# Patient Record
Sex: FEMALE | Race: BLACK OR AFRICAN AMERICAN
[De-identification: names, ages, dates, MRNs, and addresses within clinical notes are randomized per-mention and may not be internally consistent; named-entity substitution may affect disease eponyms.]

---

## 2017-04-26 ENCOUNTER — HOSPITAL ENCOUNTER (EMERGENCY)
Dept: HOSPITAL 17 - NEPE | Age: 32
Discharge: HOME | End: 2017-04-26
Payer: COMMERCIAL

## 2017-04-26 VITALS
SYSTOLIC BLOOD PRESSURE: 138 MMHG | OXYGEN SATURATION: 100 % | RESPIRATION RATE: 16 BRPM | TEMPERATURE: 98.2 F | DIASTOLIC BLOOD PRESSURE: 67 MMHG | HEART RATE: 67 BPM

## 2017-04-26 VITALS — HEIGHT: 66 IN | BODY MASS INDEX: 26.57 KG/M2 | WEIGHT: 165.35 LBS

## 2017-04-26 DIAGNOSIS — N89.8: Primary | ICD-10-CM

## 2017-04-26 LAB
CHLAMYDIA PCR: NOT DETECTED
COLOR UR: (no result)
COMMENT (UR): (no result)
CULTURE IF INDICATED: (no result)
GLUCOSE UR STRIP-MCNC: (no result) MG/DL
HGB UR QL STRIP: (no result)
KETONES UR STRIP-MCNC: (no result) MG/DL
NEISSERIA PCR: NOT DETECTED
NITRITE UR QL STRIP: (no result)
SP GR UR STRIP: 1.01 (ref 1–1.03)
SQUAMOUS #/AREA URNS HPF: <1 /HPF (ref 0–5)

## 2017-04-26 PROCEDURE — 87491 CHLMYD TRACH DNA AMP PROBE: CPT

## 2017-04-26 PROCEDURE — 87210 SMEAR WET MOUNT SALINE/INK: CPT

## 2017-04-26 PROCEDURE — 81001 URINALYSIS AUTO W/SCOPE: CPT

## 2017-04-26 PROCEDURE — 96372 THER/PROPH/DIAG INJ SC/IM: CPT

## 2017-04-26 PROCEDURE — 99283 EMERGENCY DEPT VISIT LOW MDM: CPT

## 2017-04-26 PROCEDURE — 84703 CHORIONIC GONADOTROPIN ASSAY: CPT

## 2017-04-26 PROCEDURE — 87591 N.GONORRHOEAE DNA AMP PROB: CPT
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
## 2017-05-01 ENCOUNTER — HOSPITAL ENCOUNTER (OUTPATIENT)
Dept: HOSPITAL 17 - CPRE | Age: 32
End: 2017-05-01
Payer: COMMERCIAL

## 2017-05-01 DIAGNOSIS — N62: ICD-10-CM

## 2017-05-01 DIAGNOSIS — R94.31: ICD-10-CM

## 2017-05-01 DIAGNOSIS — Z01.810: Primary | ICD-10-CM

## 2017-05-01 DIAGNOSIS — Z01.812: ICD-10-CM

## 2017-05-01 LAB
ANION GAP SERPL CALC-SCNC: 7 MEQ/L (ref 5–15)
BACTERIA #/AREA URNS HPF: (no result) /HPF
BHCG SCREEN QUALITATIVE: (no result) MIU/ML (ref 0–5)
CHLORIDE SERPL-SCNC: 103 MEQ/L (ref 98–107)
COLOR UR: YELLOW
ERYTHROCYTE [DISTWIDTH] IN BLOOD BY AUTOMATED COUNT: 13.2 % (ref 11.6–17.2)
GLUCOSE UR STRIP-MCNC: (no result) MG/DL
HCO3 BLD-SCNC: 30.4 MEQ/L (ref 21–32)
HCT VFR BLD CALC: 40.2 % (ref 35–46)
HGB UR QL STRIP: (no result)
KETONES UR STRIP-MCNC: (no result) MG/DL
MCH RBC QN AUTO: 25 PG (ref 27–34)
MCHC RBC AUTO-ENTMCNC: 31.7 % (ref 32–36)
MCV RBC AUTO: 79 FL (ref 80–100)
MUCOUS THREADS #/AREA URNS LPF: (no result) /LPF
NITRITE UR QL STRIP: (no result)
PLATELET # BLD: 241 TH/MM3 (ref 150–450)
POTASSIUM SERPL-SCNC: 4.1 MEQ/L (ref 3.5–5.1)
RBC # BLD AUTO: 5.1 MIL/MM3 (ref 4–5.3)
REVIEW FLAG: (no result)
SODIUM SERPL-SCNC: 140 MEQ/L (ref 136–145)
SP GR UR STRIP: 1.03 (ref 1–1.03)
SQUAMOUS #/AREA URNS HPF: 3 /HPF (ref 0–5)
WBC # BLD AUTO: 4.7 TH/MM3 (ref 4–11)

## 2017-05-01 PROCEDURE — 81001 URINALYSIS AUTO W/SCOPE: CPT

## 2017-05-01 PROCEDURE — 85027 COMPLETE CBC AUTOMATED: CPT

## 2017-05-01 PROCEDURE — 93005 ELECTROCARDIOGRAM TRACING: CPT

## 2017-05-01 PROCEDURE — 36415 COLL VENOUS BLD VENIPUNCTURE: CPT

## 2017-05-01 PROCEDURE — 84703 CHORIONIC GONADOTROPIN ASSAY: CPT

## 2017-05-01 PROCEDURE — 80051 ELECTROLYTE PANEL: CPT

## 2017-05-01 NOTE — EKG
Date Performed: 05/01/2017       Time Performed: 10:34:15

 

PTAGE:      31 years

 

EKG:      Sinus rhythm 

 

 LOW QRS VOLTAGE IN PRECORDIAL LEADS POSSIBLE RIGHT VENTRICULAR CONDUCTION DELAY BORDERLINE ECG 

 

NO PREVIOUS TRACING            

 

DOCTOR:   Chris Brokoe  Interpretating Date/Time  05/01/2017 16:30:40

## 2017-05-03 NOTE — MH
cc:

LORI NAVAS M.D.

****

 

DATE OF ADMISSION:  5/4/2017

 

YOB: 1985

 

CHIEF COMPLAINT

Bilateral very large breasts.  Patient desiring breast reduction.

 

HISTORY OF PRESENT ILLNESS

This is a 31-year-old black female who has seen me in my office starting March 27, 2017.  She came in for discussion about breast reduction.  She has very

large breasts for a very long time and has been thinking about getting breast

reduction over several years. The breasts were large even as a teenager.  The

patient has had two pregnancies, one child who is 8-years-old, she did

breastfeed for a short time.  There is a family history of breast cancer in her

grandmother and cousins. She personally has fibrocystic breasts but no masses

or biopsy has ever been done.  She is a nonsmoker, never smoked, has no other

major medical issues, no skin conditions or contraindications to having a

breast reduction done. She underwent detailed explanation of the surgery in my

office including the breast measurements, the position of the current position

of the nipple-areolar complex, the surgery design including the Yanez pattern

inferior pedicle method that is suitable for her. The flap elevation with

breast reduction on lateral superior and medial portions and the reshaping of

the breast with anchor design surgical scars was explained in detail.

Photographs were shown, also other methods of breast reduction were also shown

to her. The overall surgical time being 3-4 hours or slightly longer, general

anesthesia, use of sequential leg compression devices for preventing DVT,

heating blankets and postoperative respiratory deep breathing exercises and use

of Styles catheter were explained.

 

The surgery technique was explained including the flap and the breast mound

shaping, the possibility of intraoperative issues such as those related to

anesthesia or medications, those from surgery itself such as bleeding, damage

to the surrounding structures in the area of surgery including loss of

nipple-areolar sensation, devitalization of the nipple, devitalization of the

skin and fat flaps, possible damage to the remote organs and structures,

postoperatively the use of drains, use of pain medication, antibiotics. Again,

perioperative risk and complications such as bleeding, infection, seroma,

hematoma, wound dehiscence, flap necrosis, possible need for debridements and

open wound care if excising and repairing the flap is not a good option, the

possible long-term changes and surgeries for any asymmetry, fat necrosis, flap

necrosis, possible keloid formation, hypertrophic hyperpigmented or painful

scars, possible use of silicone gel sheets, Kenalog injections and also any

future reshaping surgery will not be covered by her insurance as they will be

considered cosmetic. The postoperative complications may generally be covered

by her insurance.  The patient also understands that the shape, size and

overall outcome can only be approximate and not exact, the two sides may remain

slightly asymmetric, may also settle down differently, there may be changes due

to the surgery inside the breast that may be visible on the mammogram and may

confuse interpretation of the mammogram in terms of breast cancer screening.

She may also experience disproportionate growth of the breast volume if she

loses or gains weight in the future. The patient was also shown multiple other

patient's photographs before and after and she is agreeable to go ahead with

the surgery and is excited about it.

 

PAST MEDICAL HISTORY

The patient's past medical history is negative for any major medical problems.

She does have iron deficiency anemia and there is a history of blood

transfusion being given to her past her miscarriage issue in the past. She had

an iron infusion at one time as well. It is not currently a problem.  She does

not have sickle-cell anemia.  She is not diabetic, although there is a family

history of type 2 diabetes in elderly family members.

The patient has had no others major surgeries.

 

REVIEW OF SYSTEMS

She is 5 feet 3 inches tall, weighs 180 pounds.  No never smoked.

No alcohol or drug abuse.  No risk factor for HIV or hepatitis.  The patient

does not have any bleeding tendencies.  No chronic disorders.

 

PHYSICAL EXAMINATION

GENERAL: Examination shows a 31-year-old black female with

stable vital signs.  The patient is alert, cooperative, fully oriented,

emotionally stable.  She is fully ambulant.

HEAD/NECK: Head and neck shows clear sclerae.  Pupils are equal, round,

reacting to the room light.  Trachea appears in midline.  There is no

significant masses in the Neck. The thyroid is slightly prominent but it feels

uniform to touch.  No nodules palpated.  No bruit or thrills in the area.  The

carotid arteries are also normal.  The neck is slightly on the short side but

otherwise normal movement.

CHEST: The chest has good expansion with normal breathing, moderate obesity,

overall good breath sounds.

HEART: Good heart sounds.  No murmurs.

EXTREMITIES: The upper and lower extremity are also grossly intact.  Again,

there is moderate obesity.

 

BREAST: The local examination of the breast shows bilaterally large breasts

approximately 1000 to 1100 cc volume estimated. The right side is slightly on

the smaller dimensions. The breasts do not have any dominant masses.  No nipple

discharge. Areolares are somewhat enlarged and more oval than circular. The

measurements of the breast shows neck to the nipple distance 29.5 cm on the

right, 31 cm on the left.  Nipple to the inframammary fold distance is 17 cm on

the right and 18 on the left.  Midline neck to the medial breast fold is 13 cm.

Neck to the inframammary fold is 19 cm on the right and 18.5 on the left.

Transverse chest diameter is 19 cm from the medial breast fold.  Two side

medial breast folds are less than 1 cm apart. The medial breast fold to the

nipple distance on the right is 18, on the left it is also 18.  The nipple to

the midline is 11 cm on both sides as well.

 

CLINICAL IMPRESSION

Bilateral macromastia.  The plan is to perform inferior pedicle based Yanez

pattern design reduction mammoplasty. Estimated removal approximately 50%,

500-550 cc range. The patient's laboratory tests will be reviewed when

available on the chart.

 

  

SIGNED, NOT FULLY REVIEWED

 

                               __________________________________

                           MD BRENDEN Mina/MARIBELL

D:  5/3/2017/2:19 PM

T:  5/3/2017/2:48 PM

Visit #:  [id number]

Job #:  [id number]

JUAREZ

## 2017-05-04 ENCOUNTER — HOSPITAL ENCOUNTER (OUTPATIENT)
Dept: HOSPITAL 17 - HSDC | Age: 32
Discharge: HOME | End: 2017-05-04
Payer: COMMERCIAL

## 2017-05-04 VITALS
OXYGEN SATURATION: 96 % | DIASTOLIC BLOOD PRESSURE: 62 MMHG | SYSTOLIC BLOOD PRESSURE: 114 MMHG | TEMPERATURE: 97.2 F | HEART RATE: 76 BPM | RESPIRATION RATE: 16 BRPM

## 2017-05-04 VITALS — WEIGHT: 179.9 LBS | HEIGHT: 62 IN | BODY MASS INDEX: 33.1 KG/M2

## 2017-05-04 VITALS
OXYGEN SATURATION: 98 % | RESPIRATION RATE: 18 BRPM | SYSTOLIC BLOOD PRESSURE: 102 MMHG | DIASTOLIC BLOOD PRESSURE: 64 MMHG | TEMPERATURE: 98.2 F | HEART RATE: 72 BPM

## 2017-05-04 DIAGNOSIS — N62: Primary | ICD-10-CM

## 2017-05-04 DIAGNOSIS — Z80.3: ICD-10-CM

## 2017-05-04 PROCEDURE — 00402 ANES INTEG SYS RCNSTV BREAST: CPT

## 2017-05-04 PROCEDURE — 88305 TISSUE EXAM BY PATHOLOGIST: CPT

## 2017-05-04 PROCEDURE — 19318 BREAST REDUCTION: CPT

## 2017-05-04 PROCEDURE — 88307 TISSUE EXAM BY PATHOLOGIST: CPT

## 2017-05-04 NOTE — MP
cc:

LORI BROWN

****

 

 

DATE OF SURGERY

5/4/17

 

PREOPERATIVE DIAGNOSES

Bilateral macromastia with asymmetry, left side larger than right.

 

POSTOPERATIVE DIAGNOSIS

Bilateral macromastia with asymmetry, left side larger than right.

 

OPERATION

Bilateral reduction mammoplasty

 

SURGEON

Dr. SUJATA Brown.

 

ANESTHESIA

General

 

INDICATIONS

A 31-year-old black female with  fairly large breasts, symptomatic and desiring

elective breast reduction.  She underwent detailed explanation of the

procedure, technical details,  risk, possible complications, pros, cons and

possible future surgeries and possibility of skin flap necrosis leading to open

wound treatment for prolonged period were explained.  She is willing to go

ahead with the surgery.

 

PROCEDURE IN DETAIL

The patient was brought to the operating room, was given supine position.

Anesthesia was started with general anesthetic and prep and drape was done.  IV

antibiotic had been given. A time-out was called and completed. The

preoperative markings were reinforced at the key points with a needle point

prior to the prep and they were reinforced.  The periareolar markings were made

using the largest 4.5 cm diameter on the areola marker  The inferior pedicle 
and the

periareolar area in the upper portion in the V design were de-epithelialized.

The dermal flap was . The upper flaps were raised using tumescent

solution to provide hydro dissection and hemostasis.  The lateral

de-epithelialized flap was extended all the way towards the lateral extent of

the incision leaving only a couple of centimeters of the final triangular skin

for full excision. The dermal flap was raised off the breast tissue and

preserved.  The breast reduction was carried out from lateral to medial aspect

and then  taking it superior in a continuous fashion taking the tissue down to

deep fascia. The overall hemostasis was excellent.  The dermal flap was used to

provide a lateral boundary for the final breast reduction position and it was

sutured in place with 2-0 Vicryl.  A Y pattern flaps were closed in the upper

portion using the circular area for the nipple-areolar complex. The flaps were

also tacked from all three sides  removing the excess flap at the  three-point

junction. The #10 JAE drains were placed and secured. The overall closure was

done with Vicryl and Prolene.  The patient remained stable through the

procedure.  Intraoperative blood loss less than 50 mL.  The total tissue

removed on the right side 675 grams, on the left 725.  No complications.  The

patient was allowed to recover from the anesthesia and returned to recovery

room in a stable condition.



 

SIGNED, NOT FULLY REVIEWED

                              _________________________________

                              MD BRENDEN Mina/

D:  5/4/2017/4:36 PM

T:  5/4/2017/9:03 PM

Visit #:  [id number]

Job #:  [id number]

JUAREZ

## 2017-05-05 ENCOUNTER — HOSPITAL ENCOUNTER (EMERGENCY)
Dept: HOSPITAL 17 - NEPE | Age: 32
Discharge: HOME | End: 2017-05-05
Payer: COMMERCIAL

## 2017-05-05 VITALS
HEART RATE: 72 BPM | TEMPERATURE: 98.4 F | DIASTOLIC BLOOD PRESSURE: 65 MMHG | OXYGEN SATURATION: 99 % | SYSTOLIC BLOOD PRESSURE: 119 MMHG | RESPIRATION RATE: 15 BRPM

## 2017-05-05 VITALS — WEIGHT: 165.35 LBS | BODY MASS INDEX: 26.57 KG/M2 | HEIGHT: 66 IN

## 2017-05-05 DIAGNOSIS — L76.22: Primary | ICD-10-CM
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
## 2017-05-05 PROCEDURE — 99283 EMERGENCY DEPT VISIT LOW MDM: CPT

## 2017-05-05 NOTE — PD
HPI


Chief Complaint:  Bleeding


Time Seen by Provider:  01:07


Travel History


International Travel<30 days:  No


Contact w/Intl Traveler<30days:  No


Traveled to known affect area:  No





History of Present Illness


HPI


31-year-old female who had bilateral breast reduction yesterday by Dr. Brown, 

here for evaluation of bleeding around JAE tube site.  The patient is concerned 

that she may have ripped the suture out of place.  She is having moderate 

discomfort in her chest, however it is well-controlled with the naproxen and 

Percocet that she was prescribed.  She is not on any antiplatelets or 

anticoagulants.





PFSH


Past Medical History


Anemia:  Yes


Cancer:  No


Cardiovascular Problems:  No


Diabetes:  No


Diminished Hearing:  No


Endocrine:  No


Genitourinary:  No


Hepatitis:  No


Hiatal Hernia:  No


Immune Disorder:  No


Musculoskeletal:  Yes (LOWER BACK)


Neurologic:  No


Psychiatric:  No


Reproductive:  Yes (OVARIAN  CYST)


Respiratory:  No


Integumentary:  Yes (fiber cystic to chest. Surg. scheduled for 1 week)


Immunizations Current:  Yes


Thyroid Disease:  No


:  3


Para:  1


Miscarriage:  1


:  0





Past Surgical History


AICD:  No


 Section:  Yes


Gynecologic Surgery:  Yes ( )


Joint Replacement:  No


Pacemaker:  No





Social History


Alcohol Use:  Yes (occasional)


Tobacco Use:  No


Substance Use:  No





Allergies-Medications


(Allergen,Severity, Reaction):  


Coded Allergies:  


     No Known Allergies (Verified , 17)


Reported Meds & Prescriptions





Reported Meds & Active Scripts


Active


Reported


Cephalexin 500 Mg Cap 500 Mg PO Q12H


Percocet (Oxycodone-Acetaminophen) 5-325 mg Tab 1-2 Tab PO Q8HR PRN








Review of Systems


Except as stated in HPI:  all other systems reviewed are Neg





Physical Exam


Narrative


GENERAL: Well-developed, well-nourished, comfortable, no acute distress.


BREAST:  Exam performed in the presence of a female nurse.  Patient has 

Xeroform dressing over her surgical incisions over bilateral nipples/breasts 

and underneath bilateral breasts.  There is mild dried blood on the gauze 

surrounding this Xeroform.  Bilateral posterior chest there are 2 JAE drains.  

JAE drains bulbs are inflated.  The patient does not have them depressed and is 

not using them properly.  There is mild bleeding around the site of the right 

JAE drain.





Data


Data


Last Documented VS





Vital Signs








  Date Time  Temp Pulse Resp B/P Pulse Ox O2 Delivery O2 Flow Rate FiO2


 


17 00:43 98.4 72 15 119/65 99 Room Air  








Orders





 Oxycodone-Acetamin 5-325 Mg (Percocet (17 01:15)








MDM


Medical Decision Making


Medical Screen Exam Complete:  Yes


Emergency Medical Condition:  Yes


Differential Diagnosis


Postoperative bleeding


Narrative Course


The patient had breast reduction surgery yesterday.  There is mild bleeding 

around the right JAE site.  She does not have the JAE bulb squeezed, and 

therefore does not to suction.  This is likely why there is blood leaking 

around the actual tubing site and not draining into the bulb itself.  Her vital 

signs are normal with a heart rate of 72 and a blood pressure of 119/65.  She 

is following up with her breast surgeon tomorrow.  She is stable for discharge 

home.  She was informed on when to return to the emergency department.  She 

verbalizes understanding and agreement with plan.





Diagnosis





 Primary Impression:  


 Postoperative hemorrhage of skin following non-dermatologic procedure


Referrals:  


David Brown MD


1 day





***Additional Instructions:


Follow-up with your breast surgeon tomorrow as scheduled.


Return to the emergency department for worsening symptoms or any other concerns.


Disposition:  01 DISCHARGE HOME


Condition:  Stable








Yaakov Gaines MD May 5, 2017 01:17

## 2018-05-17 ENCOUNTER — HOSPITAL ENCOUNTER (EMERGENCY)
Dept: HOSPITAL 17 - PHEFT | Age: 33
Discharge: HOME | End: 2018-05-17
Payer: COMMERCIAL

## 2018-05-17 VITALS — WEIGHT: 176.37 LBS | BODY MASS INDEX: 32.46 KG/M2 | HEIGHT: 62 IN

## 2018-05-17 VITALS
TEMPERATURE: 98.8 F | HEART RATE: 75 BPM | DIASTOLIC BLOOD PRESSURE: 63 MMHG | OXYGEN SATURATION: 98 % | RESPIRATION RATE: 16 BRPM | SYSTOLIC BLOOD PRESSURE: 114 MMHG

## 2018-05-17 DIAGNOSIS — K04.7: Primary | ICD-10-CM

## 2018-05-17 PROCEDURE — 99283 EMERGENCY DEPT VISIT LOW MDM: CPT

## 2018-05-17 NOTE — PD
HPI


Chief Complaint:  Oral / Dental Pain or Problem


Time Seen by Provider:  10:03


Travel History


International Travel<30 days:  No


Contact w/Intl Traveler<30days:  No


Traveled to known affect area:  No





History of Present Illness


HPI


32-year-old female here with right lower dental pain and facial swelling 3 

days.  No fever chills.  No difficulty swallowing.  Symptom severity is 

moderate.  Aggravated by hot and cold liquids and chewing.  No alleviating 

factors.





PFSH


Past Medical History


Anemia:  Yes


Cancer:  No


Cardiovascular Problems:  No


Diabetes:  No


Diminished Hearing:  No


Endocrine:  No


Genitourinary:  No


Hepatitis:  No


Hiatal Hernia:  No


Immune Disorder:  No


Musculoskeletal:  Yes (LOWER BACK)


Neurologic:  No


Psychiatric:  No


Reproductive:  Yes (OVARIAN  CYST)


Respiratory:  No


Integumentary:  Yes (fiber cystic to chest. Surg. scheduled for 1 week)


Immunizations Current:  Yes


Thyroid Disease:  No


Tetanus Vaccination:  > 5 Years


Influenza Vaccination:  No


Pregnant?:  Not Pregnant


LMP:  2018


:  3


Para:  1


Miscarriage:  1


:  0





Past Surgical History


AICD:  No


 Section:  Yes


Gynecologic Surgery:  Yes ( )


Joint Replacement:  No


Pacemaker:  No


Other Surgery:  Yes (breast reduction surgery, fiber cystic to chest)





Social History


Alcohol Use:  Yes (occasional)


Tobacco Use:  No


Substance Use:  No





Allergies-Medications


(Allergen,Severity, Reaction):  


Coded Allergies:  


     No Known Allergies (Verified  Adverse Reaction, Unknown, 18)


Reported Meds & Prescriptions





Reported Meds & Active Scripts


Active


No Active Prescriptions or Reported Medications    








Review of Systems


Except as stated in HPI:  all other systems reviewed are Neg


General / Constitutional:  No: Fever


HENT:  Positive: Dental Difficulties





Physical Exam


Narrative


GENERAL: Alert and well-appearing 32-year-old female


SKIN: Warm and dry.


HEAD: Normocephalic.


EYES:  No injection or drainage. 


ENT: Decayed and fractured tooth #29.  Surrounding gum erythema.  No swelling 

to the floor the mouth.  Uvula is midline.  Airways patent.  Normal phonation.


NECK: Supple, trachea midline. No  lymphadenopathy.


CARDIOVASCULAR: Regular rate and rhythm without murmurs, gallops, or rubs. 


RESPIRATORY: Breath sounds equal bilaterally. No accessory muscle use.


GASTROINTESTINAL:  nondistended. 








Data


Data


Last Documented VS





Vital Signs








  Date Time  Temp Pulse Resp B/P (MAP) Pulse Ox O2 Delivery O2 Flow Rate FiO2


 


18 09:41 98.8 75 16 114/63 (80) 98   











MDM


Medical Decision Making


Medical Screen Exam Complete:  Yes


Emergency Medical Condition:  Yes


Differential Diagnosis


Dental abscess, dental caries, fractured tooth


Narrative Course


32-year-old female here with dental abscess.  She is nontoxic appearing.  She 

has a follow-up appointment with her dentist on Tuesday.





Diagnosis





 Primary Impression:  


 Dental abscess


Referrals:  


Primary Care Physician


Departure Forms:  Tests/Procedures, Work Release   Enter return to work date:  

May 18, 2018


Scripts


Ibuprofen (Ibuprofen) 800 Mg Tab


800 MG PO Q6HR Y for PAIN, #40 TAB 0 Refills


   Prov: Rebeca Heredia         18 


Amoxicillin-Clavulanate (Augmentin) 875-125 Mg Tab


1 TAB PO BID for Infection, #20 TAB 0 Refills


   Prov: Rebeca Heredia         18


Disposition:  01 DISCHARGE HOME


Condition:  Stable











Rebeca Heredia May 17, 2018 10:19